# Patient Record
Sex: MALE | Race: WHITE | Employment: OTHER | URBAN - METROPOLITAN AREA
[De-identification: names, ages, dates, MRNs, and addresses within clinical notes are randomized per-mention and may not be internally consistent; named-entity substitution may affect disease eponyms.]

---

## 2017-02-14 ENCOUNTER — HOSPITAL ENCOUNTER (OUTPATIENT)
Age: 67
Setting detail: SPECIMEN
Discharge: HOME | End: 2017-02-14
Payer: MEDICARE

## 2017-02-14 LAB
POC INR: 2.4
PROTHROMBIN TIME, POC: 28.5 SEC (ref 9.6–14.4)

## 2017-03-14 ENCOUNTER — HOSPITAL ENCOUNTER (OUTPATIENT)
Age: 67
Setting detail: SPECIMEN
Discharge: HOME | End: 2017-03-14

## 2017-03-14 LAB
POC INR: 2.7
PROTHROMBIN TIME, POC: 33 SEC (ref 9.6–14.4)

## 2017-04-11 ENCOUNTER — HOSPITAL ENCOUNTER (OUTPATIENT)
Age: 67
Setting detail: SPECIMEN
Discharge: HOME | End: 2017-04-11
Payer: MEDICARE

## 2017-04-11 LAB
POC INR: 2.9
PROTHROMBIN TIME, POC: 34.7 SEC (ref 9.6–14.4)

## 2017-05-09 ENCOUNTER — HOSPITAL ENCOUNTER (OUTPATIENT)
Age: 67
Setting detail: SPECIMEN
Discharge: HOME | End: 2017-05-09
Payer: MEDICARE

## 2017-05-09 LAB
POC INR: 2.4
PROTHROMBIN TIME, POC: 28.5 SEC (ref 9.6–14.4)

## 2017-06-06 ENCOUNTER — HOSPITAL ENCOUNTER (OUTPATIENT)
Age: 67
Setting detail: SPECIMEN
Discharge: HOME | End: 2017-06-06
Payer: MEDICARE

## 2017-06-06 LAB
POC INR: 3.3
PROTHROMBIN TIME, POC: 39.4 SEC (ref 9.6–14.4)

## 2017-06-20 ENCOUNTER — HOSPITAL ENCOUNTER (OUTPATIENT)
Age: 67
Setting detail: SPECIMEN
Discharge: HOME | End: 2017-06-20
Payer: MEDICARE

## 2017-06-20 LAB
POC INR: 2.4
PROTHROMBIN TIME, POC: 29 SEC (ref 9.6–14.4)

## 2017-07-18 ENCOUNTER — HOSPITAL ENCOUNTER (OUTPATIENT)
Age: 67
Setting detail: SPECIMEN
Discharge: HOME | End: 2017-07-18
Payer: MEDICARE

## 2017-07-18 LAB
POC INR: 3.1
PROTHROMBIN TIME, POC: 37.2 SEC (ref 9.6–14.4)

## 2017-08-08 ENCOUNTER — HOSPITAL ENCOUNTER (OUTPATIENT)
Age: 67
Setting detail: SPECIMEN
Discharge: HOME | End: 2017-08-08
Payer: MEDICARE

## 2017-08-08 LAB
POC INR: 3
PROTHROMBIN TIME, POC: 36.3 SEC (ref 9.6–14.4)

## 2017-09-07 ENCOUNTER — HOSPITAL ENCOUNTER (OUTPATIENT)
Age: 67
Setting detail: SPECIMEN
Discharge: HOME | End: 2017-09-07
Payer: MEDICARE

## 2017-09-07 LAB
POC INR: 3.8
PROTHROMBIN TIME, POC: 45.5 SEC (ref 9.6–14.4)

## 2017-09-21 ENCOUNTER — HOSPITAL ENCOUNTER (OUTPATIENT)
Age: 67
Setting detail: SPECIMEN
Discharge: HOME | End: 2017-09-21
Payer: MEDICARE

## 2017-09-21 LAB
POC INR: 2.6
PROTHROMBIN TIME, POC: 31.1 SEC (ref 9.6–14.4)

## 2017-10-19 ENCOUNTER — HOSPITAL ENCOUNTER (OUTPATIENT)
Age: 67
Setting detail: SPECIMEN
Discharge: HOME | End: 2017-10-19
Payer: MEDICARE

## 2017-10-19 LAB
POC INR: 2.9
PROTHROMBIN TIME, POC: 34.4 SEC (ref 9.6–14.4)

## 2017-11-16 ENCOUNTER — HOSPITAL ENCOUNTER (OUTPATIENT)
Age: 67
Setting detail: SPECIMEN
Discharge: HOME | End: 2017-11-16
Payer: MEDICARE

## 2017-11-16 LAB
POC INR: 2.5
PROTHROMBIN TIME, POC: 29.6 SEC (ref 9.6–14.4)

## 2017-12-14 ENCOUNTER — HOSPITAL ENCOUNTER (OUTPATIENT)
Age: 67
Setting detail: SPECIMEN
Discharge: HOME | End: 2017-12-14
Payer: MEDICARE

## 2017-12-14 LAB
POC INR: 2.5
PROTHROMBIN TIME, POC: 30.5 SEC (ref 9.6–14.4)

## 2018-01-11 ENCOUNTER — HOSPITAL ENCOUNTER (OUTPATIENT)
Age: 68
Setting detail: SPECIMEN
Discharge: HOME | End: 2018-01-11
Payer: MEDICARE

## 2018-01-11 LAB
POC INR: 2.5
PROTHROMBIN TIME, POC: 29.4 SEC (ref 9.6–14.4)

## 2018-02-08 ENCOUNTER — HOSPITAL ENCOUNTER (OUTPATIENT)
Age: 68
Setting detail: SPECIMEN
Discharge: HOME | End: 2018-02-08
Payer: MEDICARE

## 2018-02-08 LAB
POC INR: 2.6
PROTHROMBIN TIME, POC: 31.7 SEC (ref 9.6–14.4)

## 2018-03-08 ENCOUNTER — HOSPITAL ENCOUNTER (OUTPATIENT)
Age: 68
Setting detail: SPECIMEN
Discharge: HOME | End: 2018-03-08
Payer: MEDICARE

## 2018-03-08 LAB
POC INR: 3.4
PROTHROMBIN TIME, POC: 40.6 SEC (ref 9.6–14.4)

## 2018-03-22 ENCOUNTER — HOSPITAL ENCOUNTER (OUTPATIENT)
Age: 68
Setting detail: SPECIMEN
Discharge: HOME | End: 2018-03-22
Payer: MEDICARE

## 2018-03-23 LAB
POC INR: 2
PROTHROMBIN TIME, POC: 24 SEC (ref 9.6–14.4)

## 2018-04-19 ENCOUNTER — HOSPITAL ENCOUNTER (OUTPATIENT)
Age: 68
Setting detail: SPECIMEN
Discharge: HOME | End: 2018-04-19
Payer: MEDICARE

## 2018-04-19 LAB
POC INR: 2.4
PROTHROMBIN TIME, POC: 28.3 SEC (ref 9.6–14.4)

## 2018-05-17 ENCOUNTER — HOSPITAL ENCOUNTER (OUTPATIENT)
Age: 68
Setting detail: SPECIMEN
Discharge: HOME | End: 2018-05-17
Payer: MEDICARE

## 2018-05-17 LAB
POC INR: 1.9
PROTHROMBIN TIME, POC: 22.3 SEC (ref 9.6–14.4)

## 2018-06-07 ENCOUNTER — HOSPITAL ENCOUNTER (OUTPATIENT)
Age: 68
Setting detail: SPECIMEN
Discharge: HOME | End: 2018-06-07
Payer: MEDICARE

## 2018-06-07 LAB
POC INR: 2
PROTHROMBIN TIME, POC: 23.5 SEC (ref 9.6–14.4)

## 2018-07-05 ENCOUNTER — HOSPITAL ENCOUNTER (OUTPATIENT)
Age: 68
Setting detail: SPECIMEN
Discharge: HOME | End: 2018-07-05
Payer: MEDICARE

## 2018-07-05 LAB
POC INR: 2.4
PROTHROMBIN TIME, POC: 28.9 SEC (ref 9.6–14.4)

## 2018-08-02 ENCOUNTER — HOSPITAL ENCOUNTER (OUTPATIENT)
Age: 68
Setting detail: SPECIMEN
Discharge: HOME | End: 2018-08-02
Payer: MEDICARE

## 2018-08-02 LAB
ANION GAP SERPL CALCULATED.3IONS-SCNC: 14 MMOL/L (ref 9–17)
BUN BLDV-MCNC: 19 MG/DL (ref 8–23)
BUN/CREAT BLD: NORMAL (ref 9–20)
CALCIUM SERPL-MCNC: 8.9 MG/DL (ref 8.6–10.4)
CHLORIDE BLD-SCNC: 102 MMOL/L (ref 98–107)
CO2: 25 MMOL/L (ref 20–31)
CREAT SERPL-MCNC: 1.07 MG/DL (ref 0.7–1.2)
GFR AFRICAN AMERICAN: >60 ML/MIN
GFR NON-AFRICAN AMERICAN: >60 ML/MIN
GFR SERPL CREATININE-BSD FRML MDRD: NORMAL ML/MIN/{1.73_M2}
GFR SERPL CREATININE-BSD FRML MDRD: NORMAL ML/MIN/{1.73_M2}
GLUCOSE BLD-MCNC: 98 MG/DL (ref 70–99)
POC INR: 3
POTASSIUM SERPL-SCNC: 4 MMOL/L (ref 3.7–5.3)
PROTHROMBIN TIME, POC: 35.6 SEC (ref 9.6–14.4)
SODIUM BLD-SCNC: 141 MMOL/L (ref 135–144)

## 2018-08-30 ENCOUNTER — HOSPITAL ENCOUNTER (OUTPATIENT)
Age: 68
Setting detail: SPECIMEN
Discharge: HOME | End: 2018-08-30
Payer: MEDICARE

## 2018-08-30 LAB
POC INR: 2.8
PROTHROMBIN TIME, POC: 34 SEC (ref 9.6–14.4)

## 2018-09-27 ENCOUNTER — HOSPITAL ENCOUNTER (OUTPATIENT)
Age: 68
Setting detail: SPECIMEN
Discharge: HOME | End: 2018-09-27
Payer: MEDICARE

## 2018-09-27 LAB
POC INR: 4.6
PROTHROMBIN TIME, POC: 55.8 SEC (ref 9.6–14.4)

## 2018-10-04 ENCOUNTER — HOSPITAL ENCOUNTER (OUTPATIENT)
Age: 68
Setting detail: SPECIMEN
Discharge: HOME | End: 2018-10-04
Payer: MEDICARE

## 2018-10-04 LAB
POC INR: 4.6
PROTHROMBIN TIME, POC: 54.9 SEC (ref 9.6–14.4)

## 2018-10-11 ENCOUNTER — HOSPITAL ENCOUNTER (OUTPATIENT)
Age: 68
Setting detail: SPECIMEN
Discharge: HOME | End: 2018-10-11
Payer: MEDICARE

## 2018-10-11 LAB
POC INR: 2.2
PROTHROMBIN TIME, POC: 26.4 SEC (ref 9.6–14.4)

## 2018-11-01 ENCOUNTER — HOSPITAL ENCOUNTER (OUTPATIENT)
Age: 68
Setting detail: SPECIMEN
Discharge: HOME | End: 2018-11-01
Payer: MEDICARE

## 2018-11-01 LAB
POC INR: 2.2
PROTHROMBIN TIME, POC: 26.1 SEC (ref 9.6–14.4)

## 2018-11-29 ENCOUNTER — HOSPITAL ENCOUNTER (OUTPATIENT)
Age: 68
Setting detail: SPECIMEN
Discharge: HOME | End: 2018-11-29
Payer: MEDICARE

## 2018-11-29 LAB
POC INR: 2
PROTHROMBIN TIME, POC: 24 SEC (ref 9.6–14.4)

## 2018-12-27 ENCOUNTER — HOSPITAL ENCOUNTER (OUTPATIENT)
Age: 68
Setting detail: SPECIMEN
Discharge: HOME | End: 2018-12-27
Payer: MEDICARE

## 2018-12-27 LAB
POC INR: 1.9
PROTHROMBIN TIME, POC: 22.6 SEC (ref 9.6–14.4)

## 2019-01-17 ENCOUNTER — HOSPITAL ENCOUNTER (OUTPATIENT)
Age: 69
Setting detail: SPECIMEN
Discharge: HOME | End: 2019-01-17
Payer: MEDICARE

## 2019-01-17 LAB
POC INR: 1.9
PROTHROMBIN TIME, POC: 22.9 SEC (ref 9.6–14.4)

## 2019-02-07 ENCOUNTER — HOSPITAL ENCOUNTER (OUTPATIENT)
Age: 69
Setting detail: SPECIMEN
Discharge: HOME | End: 2019-02-07
Payer: MEDICARE

## 2019-02-07 LAB
POC INR: 1.9
PROTHROMBIN TIME, POC: 23.1 SEC (ref 9.6–14.4)

## 2019-02-28 ENCOUNTER — HOSPITAL ENCOUNTER (OUTPATIENT)
Age: 69
Setting detail: SPECIMEN
Discharge: HOME | End: 2019-02-28
Payer: MEDICARE

## 2019-02-28 LAB
POC INR: 2.4
PROTHROMBIN TIME, POC: 28.9 SEC (ref 9.6–14.4)

## 2019-03-25 ENCOUNTER — HOSPITAL ENCOUNTER (OUTPATIENT)
Age: 69
Setting detail: SPECIMEN
Discharge: HOME | End: 2019-03-25
Payer: MEDICARE

## 2019-03-25 LAB
ANION GAP SERPL CALCULATED.3IONS-SCNC: 13 MMOL/L (ref 9–17)
BUN BLDV-MCNC: 23 MG/DL (ref 8–23)
BUN/CREAT BLD: ABNORMAL (ref 9–20)
CALCIUM SERPL-MCNC: 9 MG/DL (ref 8.6–10.4)
CHLORIDE BLD-SCNC: 103 MMOL/L (ref 98–107)
CO2: 25 MMOL/L (ref 20–31)
CREAT SERPL-MCNC: 0.96 MG/DL (ref 0.7–1.2)
GFR AFRICAN AMERICAN: >60 ML/MIN
GFR NON-AFRICAN AMERICAN: >60 ML/MIN
GFR SERPL CREATININE-BSD FRML MDRD: ABNORMAL ML/MIN/{1.73_M2}
GFR SERPL CREATININE-BSD FRML MDRD: ABNORMAL ML/MIN/{1.73_M2}
GLUCOSE BLD-MCNC: 105 MG/DL (ref 70–99)
HCT VFR BLD CALC: 45.5 % (ref 40.7–50.3)
HEMOGLOBIN: 14.6 G/DL (ref 13–17)
MCH RBC QN AUTO: 31.9 PG (ref 25.2–33.5)
MCHC RBC AUTO-ENTMCNC: 32.1 G/DL (ref 28.4–34.8)
MCV RBC AUTO: 99.3 FL (ref 82.6–102.9)
NRBC AUTOMATED: 0 PER 100 WBC
PDW BLD-RTO: 13.7 % (ref 11.8–14.4)
PLATELET # BLD: 169 K/UL (ref 138–453)
PMV BLD AUTO: 11.2 FL (ref 8.1–13.5)
POC INR: 1.9
POTASSIUM SERPL-SCNC: 4.5 MMOL/L (ref 3.7–5.3)
PROTHROMBIN TIME, POC: 23.1 SEC (ref 9.6–14.4)
RBC # BLD: 4.58 M/UL (ref 4.21–5.77)
SODIUM BLD-SCNC: 141 MMOL/L (ref 135–144)
WBC # BLD: 6.3 K/UL (ref 3.5–11.3)

## 2019-03-28 ENCOUNTER — HOSPITAL ENCOUNTER (INPATIENT)
Dept: CARDIAC CATH/INVASIVE PROCEDURES | Age: 69
LOS: 1 days | Discharge: HOME | DRG: 243 | End: 2019-03-29
Attending: INTERNAL MEDICINE | Admitting: INTERNAL MEDICINE
Payer: MEDICARE

## 2019-03-28 ENCOUNTER — APPOINTMENT (OUTPATIENT)
Dept: GENERAL RADIOLOGY | Age: 69
DRG: 243 | End: 2019-03-28
Attending: INTERNAL MEDICINE
Payer: MEDICARE

## 2019-03-28 DIAGNOSIS — I49.5 SICK SINUS SYNDROME (HCC): ICD-10-CM

## 2019-03-28 LAB
POC INR: 1.4
PROTHROMBIN TIME, POC: 16.6 SEC (ref 10.4–14.2)

## 2019-03-28 PROCEDURE — 33233 REMOVAL OF PM GENERATOR: CPT | Performed by: INTERNAL MEDICINE

## 2019-03-28 PROCEDURE — C1781 MESH (IMPLANTABLE): HCPCS

## 2019-03-28 PROCEDURE — 2580000003 HC RX 258

## 2019-03-28 PROCEDURE — 02PA3MZ REMOVAL OF CARDIAC LEAD FROM HEART, PERCUTANEOUS APPROACH: ICD-10-PCS | Performed by: INTERNAL MEDICINE

## 2019-03-28 PROCEDURE — 85610 PROTHROMBIN TIME: CPT

## 2019-03-28 PROCEDURE — 02HK3JZ INSERTION OF PACEMAKER LEAD INTO RIGHT VENTRICLE, PERCUTANEOUS APPROACH: ICD-10-PCS | Performed by: INTERNAL MEDICINE

## 2019-03-28 PROCEDURE — C1769 GUIDE WIRE: HCPCS

## 2019-03-28 PROCEDURE — 2500000003 HC RX 250 WO HCPCS

## 2019-03-28 PROCEDURE — 6360000002 HC RX W HCPCS: Performed by: INTERNAL MEDICINE

## 2019-03-28 PROCEDURE — 33208 INSRT HEART PM ATRIAL & VENT: CPT | Performed by: INTERNAL MEDICINE

## 2019-03-28 PROCEDURE — 33235 REMOVAL PACEMAKER ELECTRODE: CPT | Performed by: INTERNAL MEDICINE

## 2019-03-28 PROCEDURE — 6360000004 HC RX CONTRAST MEDICATION

## 2019-03-28 PROCEDURE — 6370000000 HC RX 637 (ALT 250 FOR IP): Performed by: INTERNAL MEDICINE

## 2019-03-28 PROCEDURE — 2709999900 HC NON-CHARGEABLE SUPPLY

## 2019-03-28 PROCEDURE — 6360000002 HC RX W HCPCS

## 2019-03-28 PROCEDURE — 2720000010 HC SURG SUPPLY STERILE

## 2019-03-28 PROCEDURE — 71045 X-RAY EXAM CHEST 1 VIEW: CPT

## 2019-03-28 PROCEDURE — C1730 CATH, EP, 19 OR FEW ELECT: HCPCS

## 2019-03-28 PROCEDURE — 33206 INSERT HEART PM ATRIAL: CPT | Performed by: INTERNAL MEDICINE

## 2019-03-28 PROCEDURE — C1751 CATH, INF, PER/CENT/MIDLINE: HCPCS

## 2019-03-28 PROCEDURE — 0JH606Z INSERTION OF PACEMAKER, DUAL CHAMBER INTO CHEST SUBCUTANEOUS TISSUE AND FASCIA, OPEN APPROACH: ICD-10-PCS | Performed by: INTERNAL MEDICINE

## 2019-03-28 PROCEDURE — 7100000010 HC PHASE II RECOVERY - FIRST 15 MIN

## 2019-03-28 PROCEDURE — 02H63JZ INSERTION OF PACEMAKER LEAD INTO RIGHT ATRIUM, PERCUTANEOUS APPROACH: ICD-10-PCS | Performed by: INTERNAL MEDICINE

## 2019-03-28 PROCEDURE — 7100000011 HC PHASE II RECOVERY - ADDTL 15 MIN

## 2019-03-28 PROCEDURE — 2060000000 HC ICU INTERMEDIATE R&B

## 2019-03-28 RX ORDER — CHLORAL HYDRATE 500 MG
3000 CAPSULE ORAL 2 TIMES DAILY
COMMUNITY

## 2019-03-28 RX ORDER — POTASSIUM CHLORIDE 1.5 G/1.77G
20 POWDER, FOR SOLUTION ORAL DAILY
COMMUNITY

## 2019-03-28 RX ORDER — CHLORAL HYDRATE 500 MG
3000 CAPSULE ORAL 2 TIMES DAILY
Status: DISCONTINUED | OUTPATIENT
Start: 2019-03-28 | End: 2019-03-28 | Stop reason: RX

## 2019-03-28 RX ORDER — ALPRAZOLAM 0.5 MG/1
0.5 TABLET ORAL NIGHTLY PRN
Status: DISCONTINUED | OUTPATIENT
Start: 2019-03-28 | End: 2019-03-29 | Stop reason: HOSPADM

## 2019-03-28 RX ORDER — WARFARIN SODIUM 5 MG/1
5 TABLET ORAL
COMMUNITY

## 2019-03-28 RX ORDER — METOPROLOL SUCCINATE 25 MG/1
25 TABLET, EXTENDED RELEASE ORAL DAILY
COMMUNITY

## 2019-03-28 RX ORDER — ATORVASTATIN CALCIUM 10 MG/1
10 TABLET, FILM COATED ORAL DAILY
Status: DISCONTINUED | OUTPATIENT
Start: 2019-03-28 | End: 2019-03-29 | Stop reason: HOSPADM

## 2019-03-28 RX ORDER — ATORVASTATIN CALCIUM 10 MG/1
10 TABLET, FILM COATED ORAL DAILY
COMMUNITY

## 2019-03-28 RX ORDER — ACETAMINOPHEN 325 MG/1
650 TABLET ORAL EVERY 4 HOURS PRN
Status: DISCONTINUED | OUTPATIENT
Start: 2019-03-28 | End: 2019-03-29 | Stop reason: HOSPADM

## 2019-03-28 RX ORDER — HYDROCODONE BITARTRATE AND ACETAMINOPHEN 5; 325 MG/1; MG/1
2 TABLET ORAL EVERY 4 HOURS PRN
Status: DISCONTINUED | OUTPATIENT
Start: 2019-03-28 | End: 2019-03-29 | Stop reason: HOSPADM

## 2019-03-28 RX ORDER — SODIUM CHLORIDE 9 MG/ML
INJECTION, SOLUTION INTRAVENOUS CONTINUOUS
Status: DISCONTINUED | OUTPATIENT
Start: 2019-03-28 | End: 2019-03-29 | Stop reason: HOSPADM

## 2019-03-28 RX ORDER — ASPIRIN 81 MG/1
81 TABLET ORAL DAILY
Status: DISCONTINUED | OUTPATIENT
Start: 2019-03-28 | End: 2019-03-29 | Stop reason: HOSPADM

## 2019-03-28 RX ORDER — FUROSEMIDE 40 MG/1
40 TABLET ORAL 2 TIMES DAILY
Status: DISCONTINUED | OUTPATIENT
Start: 2019-03-28 | End: 2019-03-29 | Stop reason: HOSPADM

## 2019-03-28 RX ORDER — ALPRAZOLAM 0.5 MG/1
0.5 TABLET ORAL NIGHTLY PRN
COMMUNITY

## 2019-03-28 RX ORDER — FUROSEMIDE 40 MG/1
40 TABLET ORAL 2 TIMES DAILY
COMMUNITY

## 2019-03-28 RX ORDER — METOPROLOL SUCCINATE 25 MG/1
25 TABLET, EXTENDED RELEASE ORAL DAILY
Status: DISCONTINUED | OUTPATIENT
Start: 2019-03-28 | End: 2019-03-29 | Stop reason: HOSPADM

## 2019-03-28 RX ORDER — SODIUM CHLORIDE 0.9 % (FLUSH) 0.9 %
10 SYRINGE (ML) INJECTION PRN
Status: DISCONTINUED | OUTPATIENT
Start: 2019-03-28 | End: 2019-03-29 | Stop reason: HOSPADM

## 2019-03-28 RX ORDER — CEFAZOLIN SODIUM 1 G/50ML
1 INJECTION, SOLUTION INTRAVENOUS
Status: COMPLETED | OUTPATIENT
Start: 2019-03-28 | End: 2019-03-28

## 2019-03-28 RX ORDER — HYDROCODONE BITARTRATE AND ACETAMINOPHEN 5; 325 MG/1; MG/1
1 TABLET ORAL EVERY 4 HOURS PRN
Status: DISCONTINUED | OUTPATIENT
Start: 2019-03-28 | End: 2019-03-29 | Stop reason: HOSPADM

## 2019-03-28 RX ORDER — SODIUM CHLORIDE 0.9 % (FLUSH) 0.9 %
10 SYRINGE (ML) INJECTION EVERY 12 HOURS SCHEDULED
Status: DISCONTINUED | OUTPATIENT
Start: 2019-03-28 | End: 2019-03-29 | Stop reason: HOSPADM

## 2019-03-28 RX ORDER — POTASSIUM CHLORIDE 1.5 G/1.77G
20 POWDER, FOR SOLUTION ORAL DAILY
Status: DISCONTINUED | OUTPATIENT
Start: 2019-03-28 | End: 2019-03-29 | Stop reason: HOSPADM

## 2019-03-28 RX ORDER — WARFARIN SODIUM 7.5 MG/1
7.5 TABLET ORAL DAILY
COMMUNITY

## 2019-03-28 RX ADMIN — ASPIRIN 81 MG: 81 TABLET, COATED ORAL at 20:12

## 2019-03-28 RX ADMIN — POTASSIUM CHLORIDE 20 MEQ: 1.5 POWDER, FOR SOLUTION ORAL at 20:12

## 2019-03-28 RX ADMIN — Medication 3 G: at 16:17

## 2019-03-28 RX ADMIN — SODIUM CHLORIDE: 9 INJECTION, SOLUTION INTRAVENOUS at 07:33

## 2019-03-28 RX ADMIN — ATORVASTATIN CALCIUM 10 MG: 10 TABLET, FILM COATED ORAL at 20:12

## 2019-03-28 RX ADMIN — CEFAZOLIN SODIUM 1 G: 1 INJECTION, SOLUTION INTRAVENOUS at 16:05

## 2019-03-28 RX ADMIN — METOPROLOL SUCCINATE 25 MG: 25 TABLET, FILM COATED, EXTENDED RELEASE ORAL at 20:12

## 2019-03-28 ASSESSMENT — PAIN SCALES - GENERAL
PAINLEVEL_OUTOF10: 0
PAINLEVEL_OUTOF10: 0

## 2019-03-28 NOTE — H&P
[facility]   Admission Note             Date:   3/28/2019  Patient name: Waleska Molina  Date of admission:  3/28/2019  6:12 AM  MRN:   [id number]  YOB: 1950    Reason for Admission:Ugrade of pacemaker to biventricular pacemaker    CHIEF COMPLAINT: shortness of breath. History Obtained From:  patient    HISTORY OF PRESENT ILLNESS:      The patient is a 68 y.o.  male who is admitted to the hospital for upgrade of pacemaker to biventricular pacemaker. Past Medical History:   has a past medical history of Aortic regurgitation, Atrial fibrillation ([address] Cardiomyopathy ([address] CHF (congestive heart failure) ([address] Hyperlipidemia, Hypertension, Pulmonary hypertension ([address] and Sleep apnea. Past Surgical History:   has a past surgical history that includes Appendectomy; Colonoscopy; pacemaker placement; and Cardiac defibrillator placement. Home Medications:    Prior to Admission medications    Medication Sig Start Date End Date Taking? Authorizing Provider   aspirin 81 MG tablet Take 81 mg by mouth daily   Yes Historical Provider, MD   furosemide (LASIX) 40 MG tablet Take 40 mg by mouth 2 times daily   Yes Historical Provider, MD   metoprolol succinate (TOPROL XL) 25 MG extended release tablet Take 25 mg by mouth daily   Yes Historical Provider, MD   warfarin (COUMADIN) 5 MG tablet Take 5 mg by mouth Tuesday, Thursday, Saturday & Sunday   Yes Historical Provider, MD   warfarin (COUMADIN) 7.5 MG tablet Take 7.5 mg by mouth daily Monday, Wednesday & Friday   Yes Historical Provider, MD   ALPRAZolam Kate Kimberlee) 0.5 MG tablet Take 0.5 mg by mouth nightly as needed for Sleep.    Yes Historical Provider, MD   potassium chloride (KLOR-CON) 20 MEQ packet Take 20 mEq by mouth daily   Yes Historical Provider, MD   atorvastatin (LIPITOR) 10 MG tablet Take 10 mg by mouth daily   Yes Historical Provider, MD   Omega-3 Fatty Acids (FISH OIL) 1000 MG CAPS Take 3,000 mg by mouth 2 times

## 2019-03-28 NOTE — PROGRESS NOTES
Patient admitted, consent signed, all questions answered. Pt ready for procedure. Call light to reach with side rails up 2 of 2. Lt upper chest clipped. Wife and son at bedside with patient.     Prep completed to Lt upper upper chest area with 2% Chlorhexidine Gluconate

## 2019-03-28 NOTE — OP NOTE
Upgrade of a dual chamber pacemaker to a biventricular His pacing pacemaker. DATE OF PROCEDURE:  3/28/2019    PROCEDURE PERFORMED: Explantation of right atrial and right ventricular leads  Implantation of His bundle pacing for cardiac resynchronization for cardiomyopathy and sick sinus syndrome. Patient is well known to my cardiology service. He has a history of atrial fibrillation which paroxysmal and sick sinus syndrome. He had a pacemaker implanted and has done well. He unfortunately started having worsening shortness of breath with exertion. EF decreased to 35-40%. He presents for upgrade of dual chamber pacemaker to biventricular Pacemaker. PROCEDURE NOTE:  The patient was brought to the electrophysiology lab in a  fasting state and hooked on to the continuous hemodynamic monitoring. This  includes continuous pulse oximetry, telemetry, and intermittent blood  pressure recording. He was prepped and draped in the usual sterile  fashion. A left axillary venogram was performed to evaluate for the  location and patency of the vein. 1% lidocaine was infiltrated in the left  infraclavicular area. Fluroscopy showed that the old right atrial lead had dislodged into the inferior right atrium. When moving this lead to reposition it, the right ventricular lead also dislodged to the right atrium. The decision was then made to remove these leads and implant a dual chamber HIS bundle pacing system. This will achieve cardiac resynchronization without the need for LV pacing. Using a modified Seldinger technique, the left axillary vein was accessed twice and 2 guidewires were placed in the vein. An 8-[place]amibian sheath advanced across one guidewire into the axillary vein. The Medtronic C315 His sheath was then advanced across a glidewire to the right ventricle. The RV lead, which  was a Medtronic 3830, 69 cm lead, serial #[id number] was advanced through the sheath to the His bundle area.   This was used to capture the His bundle. Testing was done in the unipolar fashion. R wave sensing was 4millivolts, threshold was  0.5volts at 1 millisecond with an impedance of 546 ohms. This lead was sutured to  prepectoral fascia. The His sheath was then slit and removed  from the  body. A 6-Israeli sheath was then advanced across the second guidewire. The right  atrial lead which was a biotronic solia , 53cm lead, serial number [id number] was  advanced to the right atrial appendage. Sensing was 1millivolts,   and impedance was 526 ohms. This lead was also sutured to prepectoral fascia. The old medtronic atrial lead was then gently extracted with gentle traction. The right ventricular lead was also extracted without any difficulties. The old pulse generator was then removed. The decision to get a biotronik pulse generator was also to allow him to have the CLS pacing system which has a better rate response as compared to the medtronic system. The pocket was then washed with antibiotic solution and the pulse generator was connected to the leads. The pulse generator was a biotronik EDORA. Serial number [id number]. This was placed in the pocket. The tyrx pouch was used for prophylaxis. The pocket was  sutured in layers with 2-0 to the fascial layer and 3-0 to the subcuticular  layer. Pacing mode was set to VVI/CLS. ower rate of 60 beats per minute, upper  tracking rate of 130 beats per minute. The RV output was 3 volt at 1 millisecond with a sensitivity  of 0.9 millivolts. IMPRESSION:  Successful implantation of a dual chamber pacemaker (HIS bundle pacing) for sick sinus syndrome and cardiac resynchronization  Extraction of right ventricular and right atrial leads. PLAN:  The patient will be observed overnight. He will have an x-ray in  the morning. Device will be interrogated. He will be discharged home in  the morning.         [facility]

## 2019-03-29 VITALS
WEIGHT: 263.4 LBS | OXYGEN SATURATION: 98 % | HEIGHT: 68 IN | SYSTOLIC BLOOD PRESSURE: 131 MMHG | RESPIRATION RATE: 15 BRPM | BODY MASS INDEX: 39.92 KG/M2 | TEMPERATURE: 98.1 F | HEART RATE: 62 BPM | DIASTOLIC BLOOD PRESSURE: 64 MMHG

## 2019-03-29 PROCEDURE — 2580000003 HC RX 258: Performed by: INTERNAL MEDICINE

## 2019-03-29 PROCEDURE — 6370000000 HC RX 637 (ALT 250 FOR IP): Performed by: INTERNAL MEDICINE

## 2019-03-29 PROCEDURE — 6360000002 HC RX W HCPCS: Performed by: INTERNAL MEDICINE

## 2019-03-29 RX ADMIN — Medication 3 G: at 04:20

## 2019-03-29 RX ADMIN — FUROSEMIDE 40 MG: 40 TABLET ORAL at 11:54

## 2019-03-29 RX ADMIN — METOPROLOL SUCCINATE 25 MG: 25 TABLET, FILM COATED, EXTENDED RELEASE ORAL at 11:54

## 2019-03-29 NOTE — DISCHARGE SUMMARY
Discharge Summary      Patient ID:  Frankey Caul  68 y.o.  1950    Admission Date: 3/28/2019     Discharge Date:  3/29/2019    Reason for Admission:Upgrade to dual chamber His bundle pacemaker. Active Problems:    Sick sinus syndrome ([address]  Resolved Problems:    * No resolved hospital problems. *                Discharge diagnosis. Sick sinus syndrome. Procedures: Dual chamber His pacemaker implant      Brief Summary of Patient's Course:  Patient presented for elective upgrade of his device to a dual chamber biventricular device. During the procedure his atrial lead was found to be dislodged and the ventricular lead also dislodged. He had a new dual chamber system implanted with His bundle lead. Patient had done well overnight with no issues. Discharge Instructions:   Call md with pus or bleeding from the device site. Activity Limitations:  No heavy lifting. Diet:  common adult     CBC: No results for input(s): WBC, HGB, HCT, MCV, PLT in the last 72 hours. BMP:No results for input(s): NA, K, CL, CO2, PHOS, BUN, CREATININE in the last 72 hours. Invalid input(s): CA   PT/INR:   Recent Labs     03/28/19  0729   PROTIME 16.6*   INR 1.4      APTT: No results for input(s): APTT in the last 72 hours. MAG: No results for input(s): MG in the last 72 hours. D Dimer: No results for input(s): DDIMER in the last 72 hours. Troponin I No results for input(s): TROPONINI in the last 72 hours. BNP No results for input(s): BNP in the last 72 hours. Discharge Medications        Roxann Brooks   Home Medication Instructions DCV:[id number]    Printed on:03/29/19 5925   Medication Information                      ALPRAZolam (XANAX) 0.5 MG tablet  Take 0.5 mg by mouth nightly as needed for Sleep.              aspirin 81 MG tablet  Take 81 mg by mouth daily             atorvastatin (LIPITOR) 10 MG tablet  Take 10 mg by mouth daily             furosemide (LASIX) 40 MG tablet  Take 40 mg by mouth 2 times daily             metoprolol succinate (TOPROL XL) 25 MG extended release tablet  Take 25 mg by mouth daily             Omega-3 Fatty Acids (FISH OIL) 1000 MG CAPS  Take 3,000 mg by mouth 2 times daily             potassium chloride (KLOR-CON) 20 MEQ packet  Take 20 mEq by mouth daily             warfarin (COUMADIN) 5 MG tablet  Take 5 mg by mouth Tuesday, Thursday, Saturday & Sunday             warfarin (COUMADIN) 7.5 MG tablet  Take 7.5 mg by mouth daily Monday, Wednesday & Friday                 Follow Up Instructions: To office in: 1 week                          Disposition- Home  Condition. Stable.   Time Spent on discharge is more than 30 minutes in the examination, evaluation    counseling and review of medications and discharge plan       Electronically signed by Home Nichole MD on 3/29/2019 at 1:03 PM

## 2019-03-29 NOTE — PROGRESS NOTES
Discharge   Discharge instruction given to pt . Pt verbalized understanding of discharge given. Pt discharged home in stable condition.  Care plan met

## 2019-04-23 ENCOUNTER — HOSPITAL ENCOUNTER (OUTPATIENT)
Age: 69
Setting detail: SPECIMEN
Discharge: HOME | End: 2019-04-23
Payer: MEDICARE

## 2019-04-23 LAB
POC INR: 1.7
PROTHROMBIN TIME, POC: 20.4 SEC (ref 9.6–14.4)

## 2019-05-21 ENCOUNTER — HOSPITAL ENCOUNTER (OUTPATIENT)
Age: 69
Setting detail: SPECIMEN
Discharge: HOME | End: 2019-05-21
Payer: MEDICARE

## 2019-05-21 LAB
POC INR: 2.4
PROTHROMBIN TIME, POC: 28.7 SEC (ref 9.6–14.4)

## 2019-06-18 ENCOUNTER — HOSPITAL ENCOUNTER (OUTPATIENT)
Age: 69
Setting detail: SPECIMEN
Discharge: HOME | End: 2019-06-18
Payer: MEDICARE

## 2019-06-18 LAB
POC INR: 2.7
PROTHROMBIN TIME, POC: 32.3 SEC (ref 9.6–14.4)

## 2019-07-16 ENCOUNTER — HOSPITAL ENCOUNTER (OUTPATIENT)
Age: 69
Setting detail: SPECIMEN
Discharge: HOME | End: 2019-07-16
Payer: MEDICARE

## 2019-07-16 LAB
POC INR: 2.5
PROTHROMBIN TIME, POC: 29.7 SEC (ref 9.6–14.4)

## 2019-08-13 ENCOUNTER — HOSPITAL ENCOUNTER (OUTPATIENT)
Age: 69
Setting detail: SPECIMEN
Discharge: HOME | End: 2019-08-13
Payer: MEDICARE

## 2019-08-13 LAB
POC INR: 2.4
PROTHROMBIN TIME, POC: 29.2 SEC (ref 9.6–14.4)

## 2019-09-10 ENCOUNTER — HOSPITAL ENCOUNTER (OUTPATIENT)
Age: 69
Setting detail: SPECIMEN
Discharge: HOME | End: 2019-09-10
Payer: MEDICARE

## 2019-09-10 LAB
POC INR: 2.8
PROTHROMBIN TIME, POC: 33.9 SEC (ref 9.6–14.4)

## 2019-10-08 ENCOUNTER — HOSPITAL ENCOUNTER (OUTPATIENT)
Age: 69
Setting detail: SPECIMEN
Discharge: HOME | End: 2019-10-08
Payer: MEDICARE

## 2019-10-08 LAB
POC INR: 2.7
PROTHROMBIN TIME, POC: 32.9 SEC (ref 9.6–14.4)

## 2019-11-07 ENCOUNTER — HOSPITAL ENCOUNTER (OUTPATIENT)
Age: 69
Setting detail: SPECIMEN
Discharge: HOME | End: 2019-11-07
Payer: MEDICARE

## 2019-11-07 LAB
POC INR: 3.4
PROTHROMBIN TIME, POC: 41.3 SEC (ref 9.6–14.4)

## 2019-12-05 ENCOUNTER — HOSPITAL ENCOUNTER (OUTPATIENT)
Age: 69
Setting detail: SPECIMEN
Discharge: HOME | End: 2019-12-05
Payer: MEDICARE

## 2019-12-05 LAB
POC INR: 3.2
PROTHROMBIN TIME, POC: 38 SEC (ref 9.6–14.4)

## 2020-01-02 ENCOUNTER — HOSPITAL ENCOUNTER (OUTPATIENT)
Age: 70
Setting detail: SPECIMEN
Discharge: HOME | End: 2020-01-02
Payer: MEDICARE

## 2020-01-02 LAB
POC INR: 2.6
PROTHROMBIN TIME, POC: 30.6 SEC (ref 9.6–14.4)

## 2020-01-30 ENCOUNTER — HOSPITAL ENCOUNTER (OUTPATIENT)
Age: 70
Setting detail: SPECIMEN
Discharge: HOME | End: 2020-01-30
Payer: MEDICARE

## 2020-01-30 LAB
POC INR: 2.7
PROTHROMBIN TIME, POC: 32.7 SEC (ref 9.6–14.4)

## 2020-02-27 ENCOUNTER — HOSPITAL ENCOUNTER (OUTPATIENT)
Age: 70
Setting detail: SPECIMEN
Discharge: HOME | End: 2020-02-27
Payer: MEDICARE

## 2020-02-27 LAB
POC INR: 2.6
PROTHROMBIN TIME, POC: 31 SEC (ref 9.6–14.4)

## 2020-04-24 ENCOUNTER — HOSPITAL ENCOUNTER (OUTPATIENT)
Age: 70
Setting detail: SPECIMEN
Discharge: HOME | End: 2020-04-24
Payer: MEDICARE

## 2020-04-24 LAB
POC INR: 4.5
PROTHROMBIN TIME, POC: 53.8 SEC (ref 9.6–14.4)

## 2020-05-15 ENCOUNTER — HOSPITAL ENCOUNTER (OUTPATIENT)
Age: 70
Setting detail: SPECIMEN
Discharge: HOME | End: 2020-05-15
Payer: MEDICARE

## 2020-05-15 LAB
POC INR: 2
PROTHROMBIN TIME, POC: 24.5 SEC (ref 9.6–14.4)

## 2020-06-15 ENCOUNTER — HOSPITAL ENCOUNTER (OUTPATIENT)
Age: 70
Setting detail: SPECIMEN
Discharge: HOME | End: 2020-06-15
Payer: MEDICARE

## 2020-06-15 LAB
POC INR: 2.4
PROTHROMBIN TIME, POC: 28.7 SEC (ref 9.6–14.4)

## 2020-07-13 ENCOUNTER — HOSPITAL ENCOUNTER (OUTPATIENT)
Age: 70
Setting detail: SPECIMEN
Discharge: HOME | End: 2020-07-13
Payer: MEDICARE

## 2020-07-13 LAB
POC INR: 2
PROTHROMBIN TIME, POC: 24.5 SEC (ref 9.6–14.4)

## 2020-08-10 ENCOUNTER — HOSPITAL ENCOUNTER (OUTPATIENT)
Age: 70
Setting detail: SPECIMEN
Discharge: HOME | End: 2020-08-10
Payer: MEDICARE

## 2020-08-10 LAB
POC INR: 1.8
PROTHROMBIN TIME, POC: 21.5 SEC (ref 9.6–14.4)

## 2020-09-08 ENCOUNTER — HOSPITAL ENCOUNTER (OUTPATIENT)
Age: 70
Setting detail: SPECIMEN
Discharge: HOME | End: 2020-09-08
Payer: MEDICARE

## 2020-09-08 LAB
POC INR: 2.3
PROTHROMBIN TIME, POC: 27.5 SEC (ref 9.6–14.4)

## 2020-10-06 ENCOUNTER — HOSPITAL ENCOUNTER (OUTPATIENT)
Age: 70
Setting detail: SPECIMEN
Discharge: HOME | End: 2020-10-06
Payer: MEDICARE

## 2020-10-06 LAB
ALBUMIN SERPL-MCNC: 3.9 G/DL (ref 3.5–5.2)
ALBUMIN/GLOBULIN RATIO: 1.3 (ref 1–2.5)
ALP BLD-CCNC: 91 U/L (ref 40–129)
ALT SERPL-CCNC: 14 U/L (ref 5–41)
ANION GAP SERPL CALCULATED.3IONS-SCNC: 16 MMOL/L (ref 9–17)
AST SERPL-CCNC: 19 U/L
BILIRUB SERPL-MCNC: 0.53 MG/DL (ref 0.3–1.2)
BUN BLDV-MCNC: 22 MG/DL (ref 8–23)
BUN/CREAT BLD: ABNORMAL (ref 9–20)
CALCIUM SERPL-MCNC: 9.2 MG/DL (ref 8.6–10.4)
CHLORIDE BLD-SCNC: 102 MMOL/L (ref 98–107)
CHOLESTEROL, FASTING: 120 MG/DL
CHOLESTEROL/HDL RATIO: 3.3
CO2: 25 MMOL/L (ref 20–31)
CREAT SERPL-MCNC: 1.03 MG/DL (ref 0.7–1.2)
GFR AFRICAN AMERICAN: >60 ML/MIN
GFR NON-AFRICAN AMERICAN: >60 ML/MIN
GFR SERPL CREATININE-BSD FRML MDRD: ABNORMAL ML/MIN/{1.73_M2}
GFR SERPL CREATININE-BSD FRML MDRD: ABNORMAL ML/MIN/{1.73_M2}
GLUCOSE BLD-MCNC: 100 MG/DL (ref 70–99)
HDLC SERPL-MCNC: 36 MG/DL
LDL CHOLESTEROL: 64 MG/DL (ref 0–130)
POC INR: 2.5
POTASSIUM SERPL-SCNC: 4.9 MMOL/L (ref 3.7–5.3)
PROTHROMBIN TIME, POC: 29.4 SEC (ref 9.6–14.4)
SODIUM BLD-SCNC: 143 MMOL/L (ref 135–144)
TOTAL PROTEIN: 7 G/DL (ref 6.4–8.3)
TRIGLYCERIDE, FASTING: 98 MG/DL
TSH SERPL DL<=0.05 MIU/L-ACNC: 2.73 MIU/L (ref 0.3–5)
VLDLC SERPL CALC-MCNC: ABNORMAL MG/DL (ref 1–30)

## 2020-11-03 ENCOUNTER — HOSPITAL ENCOUNTER (OUTPATIENT)
Age: 70
Setting detail: SPECIMEN
Discharge: HOME | End: 2020-11-03
Payer: MEDICARE

## 2020-11-03 LAB
POC INR: 2.1
PROTHROMBIN TIME, POC: 25.2 SEC (ref 9.6–14.4)

## 2020-12-01 ENCOUNTER — HOSPITAL ENCOUNTER (OUTPATIENT)
Age: 70
Setting detail: SPECIMEN
Discharge: HOME | End: 2020-12-01
Payer: MEDICARE

## 2020-12-01 LAB
POC INR: 1.9
PROTHROMBIN TIME, POC: 23.3 SEC (ref 9.6–14.4)

## 2020-12-29 ENCOUNTER — HOSPITAL ENCOUNTER (OUTPATIENT)
Age: 70
Setting detail: SPECIMEN
Discharge: HOME | End: 2020-12-29
Payer: MEDICARE

## 2020-12-29 LAB
POC INR: 2.2
PROTHROMBIN TIME, POC: 26.3 SEC (ref 9.6–14.4)

## 2021-01-26 ENCOUNTER — HOSPITAL ENCOUNTER (OUTPATIENT)
Age: 71
Setting detail: SPECIMEN
Discharge: HOME | End: 2021-01-26
Payer: MEDICARE

## 2021-01-26 LAB
POC INR: 1.7
PROTHROMBIN TIME, POC: 20.9 SEC (ref 9.6–14.4)

## 2021-02-17 ENCOUNTER — HOSPITAL ENCOUNTER (OUTPATIENT)
Age: 71
Setting detail: SPECIMEN
Discharge: HOME | End: 2021-02-17
Payer: MEDICARE

## 2021-02-17 LAB
POC INR: 2.3
PROTHROMBIN TIME, POC: 27.6 SEC (ref 9.6–14.4)

## 2021-03-17 ENCOUNTER — HOSPITAL ENCOUNTER (OUTPATIENT)
Age: 71
Setting detail: SPECIMEN
Discharge: HOME | End: 2021-03-17
Payer: MEDICARE

## 2021-03-17 LAB
POC INR: 2.4
PROTHROMBIN TIME, POC: 28.3 SEC (ref 9.6–14.4)

## 2021-04-14 ENCOUNTER — HOSPITAL ENCOUNTER (OUTPATIENT)
Age: 71
Setting detail: SPECIMEN
Discharge: HOME | End: 2021-04-14
Payer: MEDICARE

## 2021-04-14 LAB
POC INR: 2.5
PROTHROMBIN TIME, POC: 30.5 SEC (ref 9.6–14.4)

## 2021-05-12 ENCOUNTER — HOSPITAL ENCOUNTER (OUTPATIENT)
Age: 71
Setting detail: SPECIMEN
Discharge: HOME | End: 2021-05-12
Payer: MEDICARE

## 2021-05-12 LAB
POC INR: 2.7
PROTHROMBIN TIME, POC: 32.5 SEC (ref 9.6–14.4)

## 2021-06-09 ENCOUNTER — HOSPITAL ENCOUNTER (OUTPATIENT)
Age: 71
Setting detail: SPECIMEN
Discharge: HOME | End: 2021-06-09
Payer: MEDICARE

## 2021-06-09 LAB
POC INR: 3.4
PROTHROMBIN TIME, POC: 40.4 SEC (ref 9.6–14.4)

## 2021-07-14 ENCOUNTER — HOSPITAL ENCOUNTER (OUTPATIENT)
Age: 71
Setting detail: SPECIMEN
Discharge: HOME | End: 2021-07-14
Payer: MEDICARE

## 2021-07-14 LAB
POC INR: 1.3
PROTHROMBIN TIME, POC: 16.1 SEC (ref 9.6–14.4)

## 2021-08-04 ENCOUNTER — HOSPITAL ENCOUNTER (OUTPATIENT)
Age: 71
Setting detail: SPECIMEN
Discharge: HOME | End: 2021-08-04
Payer: MEDICARE

## 2021-08-04 LAB
POC INR: 3.7
PROTHROMBIN TIME, POC: 43.9 SEC (ref 9.6–14.4)

## 2021-08-11 ENCOUNTER — HOSPITAL ENCOUNTER (OUTPATIENT)
Age: 71
Setting detail: SPECIMEN
Discharge: HOME | End: 2021-08-11
Payer: MEDICARE

## 2021-08-11 LAB
POC INR: 3.7
PROTHROMBIN TIME, POC: 44.9 SEC (ref 9.6–14.4)

## 2021-08-25 ENCOUNTER — HOSPITAL ENCOUNTER (OUTPATIENT)
Age: 71
Setting detail: SPECIMEN
Discharge: HOME | End: 2021-08-25
Payer: MEDICARE

## 2021-08-25 LAB
POC INR: 2.5
PROTHROMBIN TIME, POC: 30.3 SEC (ref 9.6–14.4)

## 2021-09-27 ENCOUNTER — HOSPITAL ENCOUNTER (OUTPATIENT)
Age: 71
Setting detail: SPECIMEN
Discharge: HOME | End: 2021-09-27
Payer: MEDICARE

## 2021-09-27 LAB
POC INR: 2
PROTHROMBIN TIME, POC: 24 SEC (ref 9.6–14.4)

## 2021-10-25 ENCOUNTER — HOSPITAL ENCOUNTER (OUTPATIENT)
Age: 71
Setting detail: SPECIMEN
Discharge: HOME | End: 2021-10-25
Payer: MEDICARE

## 2021-10-25 LAB
POC INR: 2.6
PROTHROMBIN TIME, POC: 31.5 SEC (ref 9.6–14.4)

## 2021-11-22 ENCOUNTER — HOSPITAL ENCOUNTER (OUTPATIENT)
Age: 71
Setting detail: SPECIMEN
Discharge: HOME | End: 2021-11-22

## 2021-11-22 LAB
POC INR: 2.3
PROTHROMBIN TIME, POC: 27.7 SEC (ref 9.6–14.4)

## 2021-12-21 ENCOUNTER — HOSPITAL ENCOUNTER (OUTPATIENT)
Age: 71
Setting detail: SPECIMEN
Discharge: HOME | End: 2021-12-21

## 2021-12-21 LAB
POC INR: 2.6
PROTHROMBIN TIME, POC: 30.9 SEC (ref 9.6–14.4)

## 2022-01-18 ENCOUNTER — HOSPITAL ENCOUNTER (OUTPATIENT)
Age: 72
Setting detail: SPECIMEN
Discharge: HOME | End: 2022-01-18

## 2022-01-18 LAB
POC INR: 2.2
PROTHROMBIN TIME, POC: 26.4 SEC (ref 9.6–14.4)

## 2022-02-15 ENCOUNTER — HOSPITAL ENCOUNTER (OUTPATIENT)
Age: 72
Setting detail: SPECIMEN
Discharge: HOME | End: 2022-02-15

## 2022-02-15 LAB
POC INR: 2
PROTHROMBIN TIME, POC: 23.9 SEC (ref 9.6–14.4)

## 2022-03-16 ENCOUNTER — HOSPITAL ENCOUNTER (OUTPATIENT)
Age: 72
Setting detail: SPECIMEN
Discharge: HOME | End: 2022-03-16

## 2022-03-16 LAB
POC INR: 1.7
PROTHROMBIN TIME, POC: 20.5 SEC (ref 9.6–14.4)

## 2022-04-13 ENCOUNTER — HOSPITAL ENCOUNTER (OUTPATIENT)
Age: 72
Setting detail: SPECIMEN
Discharge: HOME | End: 2022-04-13

## 2022-04-13 LAB
POC INR: 1.9
PROTHROMBIN TIME, POC: 23 SEC (ref 9.6–14.4)

## 2022-05-11 ENCOUNTER — HOSPITAL ENCOUNTER (OUTPATIENT)
Age: 72
Setting detail: SPECIMEN
Discharge: HOME | End: 2022-05-11

## 2022-05-11 LAB
POC INR: 1.8
PROTHROMBIN TIME, POC: 21.3 SEC (ref 9.6–14.4)

## 2022-06-08 ENCOUNTER — HOSPITAL ENCOUNTER (OUTPATIENT)
Age: 72
Setting detail: SPECIMEN
Discharge: HOME | End: 2022-06-08

## 2022-06-08 LAB
POC INR: 2.4
PROTHROMBIN TIME, POC: 28.9 SEC (ref 9.6–14.4)

## 2022-07-06 ENCOUNTER — HOSPITAL ENCOUNTER (OUTPATIENT)
Age: 72
Setting detail: SPECIMEN
Discharge: HOME | End: 2022-07-06

## 2022-07-06 LAB
POC INR: 2.1
PROTHROMBIN TIME, POC: 25.3 SEC (ref 9.6–14.4)

## 2022-08-03 ENCOUNTER — HOSPITAL ENCOUNTER (OUTPATIENT)
Age: 72
Setting detail: SPECIMEN
Discharge: HOME | End: 2022-08-03

## 2022-08-03 LAB
POC INR: 2
PROTHROMBIN TIME, POC: 24.1 SEC (ref 9.6–14.4)

## 2022-08-31 ENCOUNTER — HOSPITAL ENCOUNTER (OUTPATIENT)
Age: 72
Setting detail: SPECIMEN
Discharge: HOME | End: 2022-08-31

## 2022-08-31 LAB
POC INR: 1.9
PROTHROMBIN TIME, POC: 23.2 SEC (ref 9.6–14.4)

## 2022-09-28 ENCOUNTER — HOSPITAL ENCOUNTER (OUTPATIENT)
Age: 72
Setting detail: SPECIMEN
Discharge: HOME | End: 2022-09-28

## 2022-09-28 LAB
POC INR: 2.6
PROTHROMBIN TIME, POC: 30.6 SEC (ref 9.6–14.4)

## 2022-10-26 ENCOUNTER — HOSPITAL ENCOUNTER (OUTPATIENT)
Age: 72
Setting detail: SPECIMEN
Discharge: HOME | End: 2022-10-26

## 2022-10-26 LAB
POC INR: 3.5
PROTHROMBIN TIME, POC: 41.8 SEC (ref 9.6–14.4)

## 2022-11-16 ENCOUNTER — HOSPITAL ENCOUNTER (OUTPATIENT)
Age: 72
Setting detail: SPECIMEN
Discharge: HOME | End: 2022-11-16

## 2022-11-16 LAB
POC INR: 2.5
PROTHROMBIN TIME, POC: 29.5 SEC (ref 9.6–14.4)

## 2022-12-14 ENCOUNTER — HOSPITAL ENCOUNTER (OUTPATIENT)
Age: 72
Setting detail: SPECIMEN
Discharge: HOME | End: 2022-12-14

## 2022-12-14 LAB
POC INR: 2.1
PROTHROMBIN TIME, POC: 25.1 SEC (ref 9.6–14.4)

## 2023-01-11 ENCOUNTER — HOSPITAL ENCOUNTER (OUTPATIENT)
Age: 73
Setting detail: SPECIMEN
Discharge: HOME | End: 2023-01-11

## 2023-01-11 LAB
POC INR: 2.1
PROTHROMBIN TIME, POC: 24.7 SEC (ref 9.6–14.4)

## 2023-02-08 ENCOUNTER — HOSPITAL ENCOUNTER (OUTPATIENT)
Age: 73
Setting detail: SPECIMEN
Discharge: HOME | End: 2023-02-08

## 2023-02-08 LAB
POC INR: 1.9
PROTHROMBIN TIME, POC: 22.2 SEC (ref 9.6–14.4)

## 2023-03-08 ENCOUNTER — HOSPITAL ENCOUNTER (OUTPATIENT)
Age: 73
Setting detail: SPECIMEN
Discharge: HOME | End: 2023-03-08

## 2023-03-08 LAB
POC INR: 2
PROTHROMBIN TIME, POC: 24.6 SEC (ref 9.6–14.4)

## 2023-04-05 ENCOUNTER — HOSPITAL ENCOUNTER (OUTPATIENT)
Age: 73
Setting detail: SPECIMEN
Discharge: HOME | End: 2023-04-05

## 2023-04-05 LAB
POC INR: 1.9
PROTHROMBIN TIME, POC: 22.4 SEC (ref 9.6–14.4)

## 2023-05-04 ENCOUNTER — HOSPITAL ENCOUNTER (OUTPATIENT)
Age: 73
Setting detail: SPECIMEN
Discharge: HOME | End: 2023-05-04

## 2023-05-04 LAB
POC INR: 1.9
PROTHROMBIN TIME, POC: 22.8 SEC (ref 9.6–14.4)

## 2023-06-01 ENCOUNTER — HOSPITAL ENCOUNTER (OUTPATIENT)
Age: 73
Setting detail: SPECIMEN
Discharge: HOME | End: 2023-06-01

## 2023-06-01 LAB
POC INR: 1.7
PROTHROMBIN TIME, POC: 20.7 SEC (ref 9.6–14.4)

## 2023-06-28 ENCOUNTER — HOSPITAL ENCOUNTER (OUTPATIENT)
Age: 73
Setting detail: SPECIMEN
Discharge: HOME | End: 2023-06-28

## 2023-06-28 LAB
POC INR: 2.4
PROTHROMBIN TIME, POC: 28.6 SEC (ref 9.6–14.4)

## 2023-07-28 ENCOUNTER — HOSPITAL ENCOUNTER (OUTPATIENT)
Age: 73
Setting detail: SPECIMEN
Discharge: HOME | End: 2023-07-28

## 2023-07-28 LAB
POC INR: 1.9
PROTHROMBIN TIME, POC: 23 SEC (ref 9.6–14.4)

## 2023-08-24 ENCOUNTER — HOSPITAL ENCOUNTER (OUTPATIENT)
Age: 73
Setting detail: SPECIMEN
Discharge: HOME | End: 2023-08-24

## 2023-08-25 LAB
POC INR: 2.2
PROTHROMBIN TIME, POC: 26.7 SEC (ref 9.6–14.4)

## 2023-09-21 ENCOUNTER — HOSPITAL ENCOUNTER (OUTPATIENT)
Age: 73
Setting detail: SPECIMEN
Discharge: HOME | End: 2023-09-21

## 2023-09-21 LAB
POC INR: 1.9
PROTHROMBIN TIME, POC: 23 SEC (ref 9.6–14.4)

## 2023-10-19 ENCOUNTER — HOSPITAL ENCOUNTER (OUTPATIENT)
Age: 73
Setting detail: SPECIMEN
Discharge: HOME | End: 2023-10-19

## 2023-10-19 LAB
POC INR: 2.1
PROTHROMBIN TIME, POC: 25 SEC (ref 9.6–14.4)

## 2023-11-16 ENCOUNTER — HOSPITAL ENCOUNTER (OUTPATIENT)
Age: 73
Setting detail: SPECIMEN
Discharge: HOME | End: 2023-11-16

## 2023-11-16 LAB
POC INR: 1.9
PROTHROMBIN TIME, POC: 23.1 SEC (ref 9.6–14.4)

## 2023-12-14 ENCOUNTER — HOSPITAL ENCOUNTER (OUTPATIENT)
Age: 73
Setting detail: SPECIMEN
Discharge: HOME | End: 2023-12-14

## 2023-12-14 LAB
POC INR: 2.2
PROTHROMBIN TIME, POC: 25.9 SEC (ref 9.6–14.4)

## 2024-01-11 ENCOUNTER — HOSPITAL ENCOUNTER (OUTPATIENT)
Age: 74
Setting detail: SPECIMEN
Discharge: HOME | End: 2024-01-11

## 2024-01-11 LAB
POC INR: 2.4
PROTHROMBIN TIME, POC: 29.2 SEC (ref 9.6–14.4)

## 2024-02-08 ENCOUNTER — HOSPITAL ENCOUNTER (OUTPATIENT)
Age: 74
Setting detail: SPECIMEN
Discharge: HOME | End: 2024-02-08

## 2024-02-08 LAB
POC INR: 2.1
PROTHROMBIN TIME, POC: 25.3 SEC (ref 9.6–14.4)

## 2024-03-07 ENCOUNTER — HOSPITAL ENCOUNTER (OUTPATIENT)
Age: 74
Setting detail: SPECIMEN
Discharge: HOME | End: 2024-03-07

## 2024-03-07 LAB
POC INR: 2
PROTHROMBIN TIME, POC: 24.4 SEC (ref 9.6–14.4)

## 2024-04-04 ENCOUNTER — HOSPITAL ENCOUNTER (OUTPATIENT)
Age: 74
Setting detail: SPECIMEN
Discharge: HOME | End: 2024-04-04

## 2024-04-04 LAB
POC INR: 2.3
PROTHROMBIN TIME, POC: 28.1 SEC (ref 9.6–14.4)

## 2024-05-02 ENCOUNTER — HOSPITAL ENCOUNTER (OUTPATIENT)
Age: 74
Setting detail: SPECIMEN
Discharge: HOME | End: 2024-05-02

## 2024-05-02 LAB
POC INR: 2.1
PROTHROMBIN TIME, POC: 25.2 SEC (ref 9.6–14.4)

## 2024-05-30 ENCOUNTER — HOSPITAL ENCOUNTER (OUTPATIENT)
Age: 74
Setting detail: SPECIMEN
Discharge: HOME | End: 2024-05-30

## 2024-05-30 LAB
POC INR: 2.3
PROTHROMBIN TIME, POC: 27.3 SEC (ref 9.6–14.4)

## 2024-06-27 ENCOUNTER — HOSPITAL ENCOUNTER (OUTPATIENT)
Age: 74
Setting detail: SPECIMEN
Discharge: HOME | End: 2024-06-27

## 2024-06-27 LAB
POC INR: 2.4
PROTHROMBIN TIME, POC: 29 SEC (ref 9.6–14.4)

## 2024-07-25 ENCOUNTER — HOSPITAL ENCOUNTER (OUTPATIENT)
Age: 74
Setting detail: SPECIMEN
Discharge: HOME | End: 2024-07-25

## 2024-07-25 LAB
INR PPP: 2.6
PROTHROMBIN TIME: 27 SEC (ref 11.7–14.9)

## 2024-08-22 ENCOUNTER — HOSPITAL ENCOUNTER (OUTPATIENT)
Age: 74
Setting detail: SPECIMEN
Discharge: HOME | End: 2024-08-22

## 2024-08-22 LAB
POC INR: 2.5
PROTHROMBIN TIME, POC: 30 SEC (ref 9.6–14.4)

## 2024-09-19 ENCOUNTER — HOSPITAL ENCOUNTER (OUTPATIENT)
Age: 74
Setting detail: SPECIMEN
Discharge: HOME | End: 2024-09-19

## 2024-09-19 LAB
POC INR: 2.4
PROTHROMBIN TIME, POC: 28.6 SEC (ref 9.6–14.4)

## 2024-10-17 ENCOUNTER — HOSPITAL ENCOUNTER (OUTPATIENT)
Age: 74
Setting detail: SPECIMEN
Discharge: HOME | End: 2024-10-17

## 2024-10-17 LAB
POC INR: 2.3
PROTHROMBIN TIME, POC: 27.6 SEC (ref 9.6–14.4)

## 2024-11-15 ENCOUNTER — HOSPITAL ENCOUNTER (OUTPATIENT)
Age: 74
Setting detail: SPECIMEN
Discharge: HOME | End: 2024-11-15

## 2024-11-15 LAB
POC INR: 2.3
PROTHROMBIN TIME, POC: 27.3 SEC (ref 9.6–14.4)

## 2024-12-12 ENCOUNTER — HOSPITAL ENCOUNTER (OUTPATIENT)
Age: 74
Setting detail: SPECIMEN
Discharge: HOME | End: 2024-12-12

## 2024-12-12 LAB
POC INR: 2.1
PROTHROMBIN TIME, POC: 25.3 SEC (ref 9.6–14.4)

## 2025-01-09 ENCOUNTER — HOSPITAL ENCOUNTER (OUTPATIENT)
Age: 75
Setting detail: SPECIMEN
Discharge: HOME | End: 2025-01-09

## 2025-01-09 LAB
POC INR: 2.6
PROTHROMBIN TIME, POC: 30.6 SEC (ref 9.6–14.4)

## 2025-02-07 ENCOUNTER — HOSPITAL ENCOUNTER (OUTPATIENT)
Age: 75
Setting detail: SPECIMEN
Discharge: HOME | End: 2025-02-07

## 2025-02-07 LAB
POC INR: 2.9
PROTHROMBIN TIME, POC: 34.6 SEC (ref 9.6–14.4)

## 2025-03-06 ENCOUNTER — HOSPITAL ENCOUNTER (OUTPATIENT)
Age: 75
Setting detail: SPECIMEN
Discharge: HOME | End: 2025-03-06

## 2025-03-06 LAB
POC INR: 3.1
PROTHROMBIN TIME, POC: 37 SEC (ref 9.6–14.4)

## 2025-04-03 ENCOUNTER — HOSPITAL ENCOUNTER (OUTPATIENT)
Age: 75
Setting detail: SPECIMEN
Discharge: HOME | End: 2025-04-03

## 2025-04-03 LAB
POC INR: 2.4
PROTHROMBIN TIME, POC: 2.4 SEC (ref 9.6–14.4)

## 2025-05-01 ENCOUNTER — HOSPITAL ENCOUNTER (OUTPATIENT)
Age: 75
Setting detail: SPECIMEN
Discharge: HOME | End: 2025-05-01

## 2025-05-01 LAB
POC INR: 2
PROTHROMBIN TIME, POC: 23.4 SEC (ref 9.6–14.4)

## 2025-05-29 ENCOUNTER — HOSPITAL ENCOUNTER (OUTPATIENT)
Age: 75
Setting detail: SPECIMEN
Discharge: HOME | End: 2025-05-29

## 2025-05-29 LAB
POC INR: 2.1
PROTHROMBIN TIME, POC: 25 SEC (ref 9.6–14.4)

## 2025-06-26 ENCOUNTER — HOSPITAL ENCOUNTER (OUTPATIENT)
Age: 75
Setting detail: SPECIMEN
Discharge: HOME | End: 2025-06-26

## 2025-06-26 LAB
POC INR: 2.4
PROTHROMBIN TIME, POC: 28.3 SEC (ref 9.6–14.4)

## 2025-07-24 ENCOUNTER — HOSPITAL ENCOUNTER (OUTPATIENT)
Age: 75
Setting detail: SPECIMEN
Discharge: HOME | End: 2025-07-24

## 2025-07-24 LAB
POC INR: 2.3
PROTHROMBIN TIME, POC: 27.9 SEC (ref 9.6–14.4)

## 2025-08-21 ENCOUNTER — HOSPITAL ENCOUNTER (OUTPATIENT)
Age: 75
Setting detail: SPECIMEN
Discharge: HOME | End: 2025-08-21

## 2025-08-21 LAB
POC INR: 3.9
PROTHROMBIN TIME, POC: 46.3 SEC (ref 9.6–14.4)